# Patient Record
(demographics unavailable — no encounter records)

---

## 2017-03-16 NOTE — CPEKG
Heart Rate: 82

RR Interval: 732

P-R Interval: 144

QRSD Interval: 74

QT Interval: 380

QTC Interval: 444

P Axis: 66

QRS Axis: -3

T Wave Axis: 34

EKG Severity - NORMAL ECG -

EKG Impression: SINUS RHYTHM

Electronically Signed By: Kwasi Burton 17-Mar-2017 08:55:58

## 2017-03-16 NOTE — GCON
[f rep st]



                                                                    CONSULTATION





PULMONARY CONSULTATION



DATE OF CONSULTATION:  03/16/2017



REASON FOR CONSULTATION:  Respiratory distress post aspiration in a patient with multiple sclerosis.



HISTORY:  The patient is a 52-year-old who is a patient at [facility].  She has end-stage multiple s
clerosis associated with dysphagia.  However, she apparently does relatively well eating most of the
 time.  She apparently aspirated a burrito this evening.  She was found apneic.  CPR was initiated, 
but full CPR was stopped secondary to her DNR status.  Several large pieces of burrito material were
 extracted from her upper airway, and she was able to breathe with less difficulty.  She was brought
 to the emergency department, where saturations were in the 80s on high-flow oxygen.  Chest x-ray wa
s unremarkable for focal findings.  It was felt that she may need a bronchoscopy to remove further f
ood material.  However, she improved following her initial evaluation.  She was eventually transferr
ed to the intensive care unit, where she is now on 4 to 6 L of oxygen and is having no respiratory d
istress.  She is conversant and denies any problems at this time.  She denies shortness of breath or
 breathing difficulties.  She has no sensation of food in her upper airways or irritation in her low
er airways.  She states that she is not hungry. 



She uses a CPAP mask with oxygen at night.  She is on oxygen during the day, she believes at 2.5 L. 
 she has been at [facility] for the last 3 months and previously was in a skilled nursing facility i
 the [facility] per her history.



PAST MEDICAL HISTORY:  Remarkable for the multiple sclerosis, dysphagia, a seizure disorder, apparen
tly a cognitive deficit, previous smoking, and COPD, along with sleep apnea or respiratory insuffici
ency requiring CPAP or BiPAP, and hypoxemia.



SOCIAL HISTORY:  As above, she is at [facility].  Her parents are dead.  She has an ex- and 2
 children, both who live elsewhere.  A daughter is in [place].  She does not know where her son is. 
 She smoked in the past.  Alcohol and tobacco are negative now.



DRUG ALLERGIES:  Hydrocodone.



FAMILY HISTORY:  Noncontributory.



REVIEW OF SYSTEMS:  10-point review of systems is negative except as outlined above.  There is no hi
story of heart disease, thromboembolic disease, etc. 



Medications from [facility] include Ultram p.r.n., Wellbutrin, oxycodone, Ditropan, Motrin, methenam
ine, Gilenya, gabapentin, baclofen, and p.r.n. cathartics.



PHYSICAL EXAMINATION:  GENERAL:  Reveals a woman who is lying comfortably in bed.  Oxygen is in plac
e at 6 L.  she is in no respiratory distress.  She is awake, alert, and communicative.  She responds
 and talks slowly, with obvious muscle weakness regarding phonation.  VITAL SIGNS:  Blood pressure i
s 90/60, saturations 98% on 6 L, pulse is approximately 80 with normal sinus rhythm on the monitor. 
 Respiratory rate is 20, relatively shallow.  HEENT:  Unremarkable for lymphadenopathy or thyromegal
y.  There is no jugular venous distention.  Mucous membranes are slightly dry.  CHEST:  Clear bilate
rally.  Excursions are quite diminished and breath sounds fairly absent at the bases.  There are no 
rhonchi, no wheezes, no rales.  There is no stridor over the larynx.  HEART:  Regular in rate and rh
ythm.  Heart tones are somewhat distant.  There is no gallop, no significant murmur.  ABDOMEN:  Mini
robert distended, soft and nontender.  Bowel sounds are present.  EXTREMITIES:  Remarkable for chroni
c change and muscle wasting.  NEUROLOGIC:  Examination is consistent with multiple sclerosis.



DATA BASE:  Chest x-ray on admission was nonfocal, without areas of infiltrate or atelectasis.  Lung
 markings were generally increased.  Followup chest x-ray done 3 hours later showed no significant c
hange. 



Laboratory:  White blood cell count is 5200, hematocrit 43, platelets 352,000.  Sodium is 139, potas
sium 5.8, BUN 24, with a creatinine of 0.6.  Glucose is 223.



ASSESSMENT AND PLAN:  

1.  Status post aspiration with respiratory arrest and successful resuscitation without intubation. 
 The patient was initially hypoxemic but now is doing well.  She has no signs of retained food mater
ials in her airways and no symptoms.  Chest x-ray shows no evidence of aspiration pneumonia or atele
ctasis associated with aspirated food material.  There are no indications for bronchoscopy at this t
[facility].  She has been started on ertapenem, which is appropriate.  This will be continued.  Bronchodila
tor treatments will be initiated.  She will be kept n.p.o. for now.  Chest x-ray, and pulmonary stat
us will be followed. 

2.  Multiple sclerosis.  This is associated with respiratory as well as peripheral muscle weakness a
nd swallowing dysfunction.  Swallow evaluation could be reassessed during this hospitalization if th
is has not been done recently.  Re-educating her regarding swallowing safety may be of benefit.

3.  History of other medical problems as outlined above.





Job #:  [id number]

## 2017-03-16 NOTE — GHP
[f rep st]



                                                            HISTORY AND PHYSICAL





DATE OF ADMISSION:  03/16/2017



CHIEF COMPLAINT:  Respiratory arrest secondary to choking.



HISTORY OF PRESENT ILLNESS:  Patient is a 52-year-old female, brought in by EMS after respiratory ar
rest secondary to choking on a burrito while at [facility].  She has a history of end-stage multiple
 sclerosis, dysphagia, and seizure disorder.  Per EMS, [facility] staff found her apneic on the floo
r in the process of eating a burrito. The fire department initiated CPR until staff presented DNR michaela daly.  When the EMS arrived, she had a pulse and they were able to remove several large chunks of
 burrito from her airway.  Since then, she was confused and hypoxemic in the 80s while on 15 L. 



Upon my interview, patient denies having issues swallowing, choking, or coughing after eating or dri
nking.  She is wheelchair bound.  She is able to do some ADLs like feed herself and change her shirt
.  She felt feverish and then cold this evening.  Denies headache or sweats.  Reports chronic pain a
ll over.



REVIEW OF SYSTEMS:  I completed a 10-point review of systems, negative except as noted in HPI.



PAST MEDICAL HISTORY:  Per [facility] paperwork MS, dysphagia, seizure disorder, cognitive communica
tion deficit, COPD, and muscle wasting.



PAST SURGICAL HISTORY:  Two back surgeries.



SOCIAL HISTORY:  Lives at [facility] for the past 2 months from [place].  Her family is in [place].  She was adopted.  She smokes a few cigarettes a day.



FAMILY HISTORY:  No history of CAD.



ALLERGIES:  Hydrocodone.



HOME MEDICATION:  Herbal supplements, Tramadol 50 mg q.6h p.r.n., Wellbutrin 150 mg twice daily, vit
amin D2 2000 units daily, oxycodone 10 mg q.6 hours p.r.n., oxybutynin 50 mg daily, milk of magnesiu
m p.r.n., ibuprofen three times daily, Hiprex 1 g twice daily, Gilenya 0.5 mg daily, gabapentin 300 
mg four times daily, Dulcolax and baclofen 20 four times daily.



PHYSICAL EXAMINATION:  VITAL SIGNS:  Temperature 36.5, blood pressure 91/61, heart rate 80s to 104, 
respirations 16 and was 81% on 15 L non-rebreather, now 196 on 6 L nasal cannula.  GENERAL:  Patient
 is cachectic, lying in bed, mildly somnolent. HEENT:  PERRLA.  EOMI.  Oropharynx is clear.  CV:  Re
gular rate and rhythm.  No murmurs, gallops, or rubs.  LUNGS:  Diminished.  No crackles or wheezing.
  ABDOMEN:  Soft, nontender, nondistended.  :  Suprapubic catheter in place.  No suprapubic tender
ness.  MUSCULOSKELETAL:  Mild contractures.  SKIN:  Warm, dry, some sacral erythema.  No skin breakd
own.  NEURO:  2 through 12 intact.  PSYCHIATRIC:  Alert and oriented x2.  Not to place.



LABS:  Sodium 139, potassium 5.8, chloride 105, carbon dioxide 19, BUN 24, creatinine 0.8, glucose 2
23.  Calcium 9.3.  WBC 5.2, hemoglobin 14, hematocrit 43, platelets 352. 



Chest x-ray personally reviewed by me.  No overt opacity or effusion. 



EKG is pending.



ASSESSMENT AND PLAN:  

1.  Respiratory arrest:  Secondary to choking on a burrito.  Patient was initially requiring a non-r
ebreather, now on 6 L nasal cannula.  Concern for aspiration.  She is currently afebrile without jayro
kocytosis.  No evidence of pneumonitis.  We will hold off on antibiotics at this time.  Dr. William PASCUAL
[facility] with Pulmonology was consulted from the emergency room and may perform a bronchoscopy tonight o
r tomorrow.

2.  Dysphagia.  This is permissive [facility] records.  Patient denies.  We will have Speech Therapy
 evaluate.  Hold oral medications until that is done with concern of aspiration.

3.  Chronic pain, again holding orals.  Will trial Toradol.

4.  End-stage multiple sclerosis.  The patient is wheelchair bound.

5.  Tobacco abuse.  Patient was advised on cessation.

6.  DVT prophylaxis:  Medium to high risk on Lovenox.



DISPOSITION:  Patient warrants inpatient admission given acute respiratory arrest with possible aspi
ration.





Job #:  [id number]

## 2017-03-16 NOTE — EDPHY
H & P


Constitutional: 


 Initial Vital Signs











Temperature (C)  35.8 C L  17 18:00


 


Heart Rate  111 H  17 18:00


 


Respiratory Rate  20   17 18:00


 


Blood Pressure  132/82 H  17 18:00


 


O2 Sat (%)  86 L  17 18:00








 











O2 Delivery Mode               Non-Rebreather Mask


 


O2 (L/minute)                  15














Allergies/Adverse Reactions: 


 





hydrocodone Allergy (Verified 17 18:13)


 








Home Medications: 














 Medication  Instructions  Recorded


 


Baclofen 20 mg (*)  17


 


Bisacodyl [Dulcolax]  17


 


Enema  17


 


Ergocalciferol  17


 


Gabapentin  17


 


Gilenya  17


 


Hiprex 1 gm (*)  17


 


Ibuprofen  17


 


Milk of Magnesia  17


 


OXYBUTYNIN CHLORIDE ER  17


 


Probiotic  17


 


oxyCODONE IR  17


 


traMADol  17














Medical Decision Making


ED Course/Re-evaluation: 





CHIEF COMPLAINT: Respiratory arrest, choking





HISTORY OF PRESENT ILLNESS: The patient is a 51 y/o female arriving emergent 

via EMS after a suspected respiratory arrest secondary to choking on food while 

at [facility]. She has a history of end-stage multiple sclerosis, dysphagia, 

and seizure disorder. Per EMS, [facility] staff found her apneic on the floor 

apparently in the process of eating a burrito. The fire department initiated 

CPR until staff presented DNR paperwork. When EMS arrived she had a pulse and 

they were able to remove "several large chunks of burrito" from her upper 

airway. She has been confused and hypoxemic in the 80% range throughout 

transport despite 15LPM O2. Patient is unable to contribute significantly to 

history, though this my be her baseline.





REVIEW OF SYSTEMS:





A 10 point review of systems was performed and is negative with the exception 

of the elements mentioned in the history of present illness.





PHYSICAL EXAM:





Vitals: Tachycardic 120, Hypoxemic 86% on 15LPM


General Appearance: Alert to voice, well hydrated, chronically ill-appearing.


Head: Atraumatic without scalp tenderness or obvious injury


Eyes: Pupils equal, round, reactive to light and accommodation, EOMI, no trauma

, no injection.


Ears: Clear bilaterally, no perforation, normal landmarks


Nose: Atraumatic, no rhinorrhea, clear.


Throat: mucus membranes moist


Neck: No visible trauma or JVD


Respiratory: Tachypnea. Coarse rhonchi throughout with upper and lower airway 

sounds. 


Cardiovascular: Tachycardic regular rate and rhythm, no murmurs, rubs, or 

gallops. Bilateral radial pulses intact. Good capillary refill all extremities.


Gastrointestinal: Abdomen is soft, non-distended, no masses.


Musculoskeletal: atraumatic, diffuse muscle atrophy.


Neurological: Alert to verbal stimulus, oriented to person, minimally 

interactive. Able to follow basic commands. 


Skin: No rashes, good turgor, no nodules on palpation.





PAST MEDICAL HISTORY: per [facility] paperwork: MS, dysphagia, seizure disorder

, cognitive communication deficit, COPD, muscle wasting and atrophy





PAST SURGICAL HISTORY: Unknown





SOCIAL HISTORY: lives at [facility]. Status: Do Not Resuscitate and Do Not 

Intubate. 





DIAGNOSTICS/PROCEDURES/CRITICAL CARE TIME:





Study: Chest x-ray


Indication: Respiratory arrest, Hypoxemia


Results: Chest x-ray was obtained.  The results of the study are 


1. Query airways disease. 


2. Increased lung markings may be artifactual or could reflect an element of 

interstitial lung disease. 


The study was read by the radiologist, Dr. Govea.  I viewed the images myself on 

the PACS system.





Critical care time spent by me, Dr. Dickens, exclusively with this patient was 

40 minutes, exclusive of PA time and exclusive of procedures.  The organ system 

at risk was respiratory system and I gave IVF and antibiotics to prevent  

worsening of the patients condition.





DIFFERENTIAL DIAGNOSIS: The differential diagnosis for the patient's hypoxemia 

included but was not limited to aspiration, end-stage MS, pneumonia, myocardial 

infarction, acute mountain sickness, high altitude pulmonary edema, congestive 

heart failure, and pulmonary embolus.





MEDICAL DECISION MAKIN: Respiratory at bedside. 





175: Met EMS upon arrival and took report. This is a 51 y/o female with end-

stage MS and esophageal dysfunction who presents emergently after being found 

apneic with apparent choking and aspiration of a burrito. She received chest 

compressions on scene by [facility] before staff found her DNR. EMS removed several 

large pieces of food from her upper airway and patient was breathing 

spontaneously en route. She has been hypoxemic around 85% on a NRB. She 

tachycardic around 120 and tachypneic at 28. She will likely require 

bronchoscopy, but has Do Not Intubate status. 





1800: Portable chest x-ray reveals: interstitial lung disease without obvious 

infiltrate. Pulmonology has been paged.





1800: 1gm IV Ertapenem administered for suspected aspiration pneumonia. 





181: Consulted with Dr. Rangel, hospitalist. She accepts admission to ICU. 





182: Consulted with Dr. William Escamilla, pulmonologist. He with perform 

bronchoscopy during her admission. 





183: Patient has been warmed to normal temperature. Her current SpO2 is 80% on 

NRB, tachycardic 103, /72. 





- Data Points


Laboratory Results: 


 Laboratory Results





 17 18:10 





 17 18:10 





 











  17





  18:10 18:10


 


WBC    5.27 10^3/uL 10^3/uL





    (3.80-9.50) 


 


RBC    4.56 10^6/uL 10^6/uL





    (4.18-5.33) 


 


Hgb    14.3 g/dL g/dL





    (12.6-16.3) 


 


Hct    43.7 % %





    (38.0-47.0) 


 


MCV    95.8 fL fL





    (81.5-99.8) 


 


MCH    31.4 pg pg





    (27.9-34.1) 


 


MCHC    32.7 g/dL g/dL





    (32.4-36.7) 


 


RDW    14.2 % %





    (11.5-15.2) 


 


Plt Count    352 10^3/uL 10^3/uL





    (150-400) 


 


MPV    9.9 fL fL





    (8.7-11.7) 


 


Neut % (Auto)    Not Reported 





   


 


Lymph % (Auto)    Not Reported 





   


 


Mono % (Auto)    Not Reported 





   


 


Eos % (Auto)    Not Reported 





   


 


Baso % (Auto)    Not Reported 





   


 


Nucleat RBC Rel Count    0.0 % %





    (0.0-0.2) 


 


Absolute Neuts (auto)    Not Reported 





   


 


Absolute Lymphs (auto)    Not Reported 





   


 


Absolute Monos (auto)    Not Reported 





   


 


Absolute Eos (auto)    Not Reported 





   


 


Absolute Basos (auto)    Not Reported 





   


 


Absolute Nucleated RBC    0.00 10^3/uL 10^3/uL





    (0-0.01) 


 


Immature Gran %    Not Reported 





   


 


Seg Neutrophils %    72 % %





   


 


Band Neutrophils %    7 % %





   


 


Lymphocytes %    10 % %





   


 


Monocytes %    6 % %





   


 


Eosinophils %    1 % %





   


 


Metamyelocytes %    2 % %





   


 


Myelocytes %    2 % %





   


 


Immature Gran #    Not Reported 





   


 


Absolute Seg Neuts    3.79 10^/uL 10^/uL





    (1.70-6.50) 


 


Absolute Band Neuts    0.37 10^3/uL 10^3/uL





    (0.00-0.70) 


 


Absolute Lymphocytes    0.53 10^3/uL L 10^3/uL





    (1.00-3.00) 


 


Absolute Monocytes    0.32 10^3/uL 10^3/uL





    (0.30-0.80) 


 


Absolute Eosinophils    0.05 10^3/uL 10^3/uL





    (0.03-0.40) 


 


Absolute Metamyelocyte    0.11 10^3/mL H 10^3/mL





    (0.00-0.00) 


 


Absolute Myelocytes    0.11 10^3/mL H 10^3/mL





    (0.00-0.00) 


 


Atypical Lymphocytes    1+  H 





   


 


Platelet Estimate    ADEQUATE 





    (ADEQ) 


 


Large Platelets    PRESENT  H 





   


 


Microcytic Cells    1+  H 





   


 


Echinocytes    1+  H 





   


 


Smear Review By    Pending 





   


 


Sodium  139 mEq/L mEq/L  





   (134-144)  


 


Potassium  5.8 mEq/L H mEq/L  





   (3.5-5.2)  


 


Chloride  105 mEq/L mEq/L  





   ()  


 


Carbon Dioxide  19 mEq/l L mEq/l  





   (22-31)  


 


Anion Gap  15 mEq/L mEq/L  





   (8-16)  


 


BUN  24 mg/dL H mg/dL  





   (7-23)  


 


Creatinine  0.6 mg/dL mg/dL  





   (0.6-1.0)  


 


Estimated GFR  > 60   





   


 


Glucose  223 mg/dL H mg/dL  





   ()  


 


Calcium  9.3 mg/dL mg/dL  





   (8.5-10.4)  


 


Specimen Hemolysis  199   





   











Medications Given: 


 








Discontinued Medications





Ertapenem 1 gm/ Sodium (Chloride)  100 mls @ 200 mls/hr IV EDNOW ONE


   PRN Reason: Protocol


   Stop: 17 18:29


   Last Admin: 17 18:47 Dose:  100 mls








Departure





- Departure


Clinical Impression: 


 Hypoxemia





Aspiration into respiratory tract


Qualifiers:


 Encounter type: initial encounter Qualified Code(s): T17.908A - Unspecified 

foreign body in respiratory tract, part unspecified causing other injury, 

initial encounter





Condition: Critical


Report Scribed for: Mat Dickens


Report Scribed by: Joan Geronimo


Date of Report: 17


Time of Report: 17:51

## 2017-03-17 NOTE — PDIAF
- Diagnosis


Code Status: Full Code





- Medication Management


Discharge Medications: 


 Medications to Continue on Transfer





Baclofen [Baclofen 20 mg (*)] 20 mg PO QID 03/16/17 [Last Taken Unknown]


Bisacodyl [Dulcolax] 10 mg RC ONCE PRN 03/16/17 [Last Taken Unknown]


Ergocalciferol (Vitamin D2) [Vitamin D2] 2,000 unit PO DAILY 03/16/17 [Last 

Taken Unknown]


Fingolimod HCl [Gilenya] 0.5 mg PO DAILY 03/16/17 [Last Taken Unknown]


Gabapentin [Neurontin 300 MG (*)] 300 mg PO QID 03/16/17 [Last Taken Unknown]


Herbals/Supplements -Info Only 1 ea PO DAILY 03/16/17 [Last Taken Unknown]


Ibuprofen [Motrin (*)] 600 mg PO TID 03/16/17 [Last Taken Unknown]


Magnesium Hydroxide [Milk of Magnesia] 30 ml PO DAILY PRN 03/16/17 [Last Taken 

Unknown]


Methenamine Aaron [Hiprex 1 gm (*)] 1 gm PO BID 03/16/17 [Last Taken Unknown]


Oxybutynin Chloride [Ditropan Xl] 15 mg PO DAILY 03/16/17 [Last Taken Unknown]


buPROPion SR [Wellbutrin 150mg SR (*)] 150 mg PO BID 03/16/17 [Last Taken 

Unknown]


oxyCODONE IR [Oxycodone Ir (*)] 10 mg PO Q4H PRN 03/16/17 [Last Taken Unknown]


traMADol [Ultram 50 mg (*)] 50 mg PO Q6H PRN 03/16/17 [Last Taken Unknown]


Acetaminophen [Tylenol 325mg (*)] 650 mg PO Q4HRS PRN #0 tab 03/17/17 [Last 

Taken Unknown]


Amoxicillin/Clavulanate Pot [Augmentin 875 MG TAB (*)] 875 mg PO BID #10 tab 03/ 17/17 [Last Taken Unknown]








Discharge Medications: Refer to the Discharge Home Medication list for PRN 

reason.





- Orders


Services needed: Registered Nurse, Certified Nursing Aide, Physical Therapy, 

Occupational Therapy, Speech Language Pathologist


Diet Texture: Dysphagia 3 - Advanced - Moist, Bite-Size, Nectar Thick Liquids, 

Meds Whole in Puree





- Follow Up Care


Current Providers and Referrals: 


CHERY BREWER [Primary Care Provider] - As per Instructions

## 2017-03-17 NOTE — PDDCSUM
Discharge Summary


Discharge Summary: 





Dates of service 3/116-3/17/17





Discharge dx:


# acute on chronic hypoxic respiratory failure


# airway obstruction/choking


# chronic dysphagia


# chronic pain


# end stage MS


# copd


# quincy





Consultations: pulmonary


Procedures performed: none





Hospital course by problem:


# acute on chronic hypoxic respiratory failure: 2/2 airway obstruction, 

improved s/p relief of obstruction. Does not have any e/o aspiration pna and is 

back on baseline 2L supplemental o2. 


# airway obstruction: patient choked on burrito at snf, this was relieved by 

emergency staff


# chronic dysphagia: patient evaluated by SLP and cleared for dysphagia diet, 

no thin liquids, tolerating well


# chronic end stage MS: will return to snf


# copd/quincy





Dispo: dc back to [facility]


Meds: will continue Augmentin x 5 days given significant aspiration event


> 35 minutes spent in dc of patient more than half in coordination of care

## 2017-03-17 NOTE — PDINTPN
Intensivist Progress Note


Assessment/Plan: 


Assessment:


* Possible aspiration-CXR okay and pt at baseline O2 req


* MS


* COPD


* PEARL


* Sz disorder























Plan:


Okay for return to SNF


Continue abx for 5 days





Subjective: 





Comfortable.


Objective: 





 Vital Signs











Temp Pulse Resp BP Pulse Ox


 


 36.6 C   91   19   122/64 H  97 


 


 03/17/17 08:00  03/17/17 08:00  03/17/17 08:00  03/17/17 08:00  03/17/17 08:00








 Laboratory Results





 03/17/17 04:20 





 03/17/17 04:20 





 











 03/16/17 03/17/17 03/18/17





 05:59 05:59 05:59


 


Intake Total  1086 


 


Output Total  600 


 


Balance  486 














Physical Exam





- Physical Exam


General Appearance: alert, no apparent distress


EENT: PERRL/EOMI, normal ENT inspection, pharynx normal, TMs normal


Neck: non-tender, full range of motion, supple, normal inspection


Respiratory: crackles (few), No respiratory distress, No stridor, No wheezing


Cardiac/Chest: normal peripheral pulses, regular rate, rhythm


Peripheral Pulses: 2+: carotid (R), carotid (L), femoral (R), femoral (L), 

dorsalis-pedis (R), dorsalis-pedis (L)


Abdomen: normal bowel sounds, non-tender, soft


Pelvic Exam: deferred


Rectal: deferred


Skin: normal color, warm/dry





ICD10 Worksheet


Patient Problems: 


 Problems











Problem Status Onset


 


Aspiration into respiratory tract Acute  


 


Hypoxemia Acute